# Patient Record
Sex: FEMALE | Race: BLACK OR AFRICAN AMERICAN | NOT HISPANIC OR LATINO | Employment: UNEMPLOYED | URBAN - METROPOLITAN AREA
[De-identification: names, ages, dates, MRNs, and addresses within clinical notes are randomized per-mention and may not be internally consistent; named-entity substitution may affect disease eponyms.]

---

## 2023-10-20 ENCOUNTER — HOSPITAL ENCOUNTER (OUTPATIENT)
Dept: CARDIOLOGY | Facility: HOSPITAL | Age: 88
Discharge: HOME | End: 2023-10-20
Payer: MEDICARE

## 2023-10-20 DIAGNOSIS — I48.21 PERMANENT ATRIAL FIBRILLATION (MULTI): ICD-10-CM

## 2023-10-20 PROCEDURE — 93248 EXT ECG>7D<15D REV&INTERPJ: CPT | Performed by: INTERNAL MEDICINE

## 2023-11-15 ENCOUNTER — OFFICE VISIT (OUTPATIENT)
Dept: CARDIOLOGY | Facility: CLINIC | Age: 88
End: 2023-11-15
Payer: MEDICARE

## 2023-11-15 VITALS
HEIGHT: 63 IN | DIASTOLIC BLOOD PRESSURE: 87 MMHG | OXYGEN SATURATION: 97 % | HEART RATE: 93 BPM | WEIGHT: 137 LBS | SYSTOLIC BLOOD PRESSURE: 159 MMHG | BODY MASS INDEX: 24.27 KG/M2

## 2023-11-15 DIAGNOSIS — I48.21 PERMANENT ATRIAL FIBRILLATION (MULTI): ICD-10-CM

## 2023-11-15 PROCEDURE — 1036F TOBACCO NON-USER: CPT | Performed by: INTERNAL MEDICINE

## 2023-11-15 PROCEDURE — 93010 ELECTROCARDIOGRAM REPORT: CPT | Performed by: INTERNAL MEDICINE

## 2023-11-15 PROCEDURE — 1159F MED LIST DOCD IN RCRD: CPT | Performed by: INTERNAL MEDICINE

## 2023-11-15 PROCEDURE — 99213 OFFICE O/P EST LOW 20 MIN: CPT | Mod: PO,25 | Performed by: INTERNAL MEDICINE

## 2023-11-15 PROCEDURE — 93005 ELECTROCARDIOGRAM TRACING: CPT | Mod: PO | Performed by: INTERNAL MEDICINE

## 2023-11-15 PROCEDURE — 99213 OFFICE O/P EST LOW 20 MIN: CPT | Performed by: INTERNAL MEDICINE

## 2023-11-15 PROCEDURE — 1126F AMNT PAIN NOTED NONE PRSNT: CPT | Performed by: INTERNAL MEDICINE

## 2023-11-15 RX ORDER — METOPROLOL SUCCINATE 25 MG/1
50 TABLET, EXTENDED RELEASE ORAL EVERY MORNING
COMMUNITY

## 2023-11-15 RX ORDER — DILTIAZEM HYDROCHLORIDE 240 MG/1
240 CAPSULE, COATED, EXTENDED RELEASE ORAL DAILY
COMMUNITY

## 2023-11-15 RX ORDER — CHOLECALCIFEROL (VITAMIN D3) 50 MCG
50 TABLET ORAL DAILY
COMMUNITY

## 2023-11-15 RX ORDER — ENALAPRIL MALEATE 10 MG/1
10 TABLET ORAL DAILY
COMMUNITY

## 2023-11-15 RX ORDER — IPRATROPIUM BROMIDE 21 UG/1
2 SPRAY, METERED NASAL EVERY 12 HOURS
COMMUNITY

## 2023-11-15 RX ORDER — LEVOTHYROXINE SODIUM 112 UG/1
112 CAPSULE ORAL
COMMUNITY

## 2023-11-15 RX ORDER — METFORMIN HYDROCHLORIDE EXTENDED-RELEASE TABLETS 1000 MG/1
1000 TABLET, FILM COATED, EXTENDED RELEASE ORAL 2 TIMES DAILY
COMMUNITY

## 2023-11-15 RX ORDER — CALCIUM CARBONATE 300MG(750)
400 TABLET,CHEWABLE ORAL DAILY
COMMUNITY

## 2023-11-15 RX ORDER — BLOOD SUGAR DIAGNOSTIC
STRIP MISCELLANEOUS
COMMUNITY
Start: 2015-05-27

## 2023-11-15 RX ORDER — GLIMEPIRIDE 4 MG/1
4 TABLET ORAL 2 TIMES DAILY
COMMUNITY

## 2023-11-15 RX ORDER — FERROUS SULFATE 325(65) MG
65 TABLET, DELAYED RELEASE (ENTERIC COATED) ORAL 2 TIMES DAILY
COMMUNITY

## 2023-11-15 RX ORDER — SIMVASTATIN 10 MG/1
10 TABLET, FILM COATED ORAL NIGHTLY
COMMUNITY

## 2023-11-15 RX ORDER — HYDROCHLOROTHIAZIDE 25 MG/1
25 TABLET ORAL DAILY
COMMUNITY

## 2023-11-15 RX ORDER — OMEPRAZOLE 40 MG/1
40 CAPSULE, DELAYED RELEASE ORAL 2 TIMES DAILY
COMMUNITY

## 2023-11-15 RX ORDER — KETOCONAZOLE 20 MG/G
CREAM TOPICAL DAILY
COMMUNITY

## 2023-11-15 ASSESSMENT — ENCOUNTER SYMPTOMS
LOSS OF SENSATION IN FEET: 0
DEPRESSION: 0
OCCASIONAL FEELINGS OF UNSTEADINESS: 0

## 2023-11-15 ASSESSMENT — PATIENT HEALTH QUESTIONNAIRE - PHQ9
SUM OF ALL RESPONSES TO PHQ9 QUESTIONS 1 AND 2: 0
1. LITTLE INTEREST OR PLEASURE IN DOING THINGS: NOT AT ALL
2. FEELING DOWN, DEPRESSED OR HOPELESS: NOT AT ALL

## 2023-11-15 ASSESSMENT — PAIN SCALES - GENERAL: PAINLEVEL: 0-NO PAIN

## 2023-11-15 NOTE — PATIENT INSTRUCTIONS
We discussed that the heart monitor showed that during the day the heart rates can get fast - up to 140 bpm. This is why you are likely getting short of breath with activity.    At night your heart rates dip quite low in the 30's and the average heart rate is 76 bpm.  This means that we cannot add in any more medication to slow the heart rates because then you would go too slow.  A pacemaker would help support the heart rate and permit us to uptitrate the medication to bring the rates down.    Please refer to  your pacemaker booklet for additional information.  [facility] will follow up with you in the coming weeks to see if you have any questions or if you would like to proceed.   You can call us at [phone] at any time.     Schedule a follow up in 6 months.

## 2023-11-15 NOTE — PROGRESS NOTES
Returns for follow up visit for  to discuss results of cardiac monitor.          History Of Present Illness:    Mary Andersen is a 89 y.o. year old female patient     89 yo seen for persistent atrial fibrillation. She is on a rate control strategy.     She was seen in September 2023 for routine follow up.  She is living with her daughter. The daughter notes that she doesn't do much walking becauese she does become short of breath after short distances. With a grocery cart she is better. She is cooking her own meals every day.     She denies any shortness of breath, no lower extremity edema. She also denies orthopnea or PND> No syncope and or falls.  We had decided to place a heart monitor to assess her heart rates with activity.    She denied orthopnea, or lower extremity edema.    Past Medical History:  Past Medical History:   Diagnosis Date    Other conditions influencing health status     Arthritis    Personal history of diseases of the blood and blood-forming organs and certain disorders involving the immune mechanism     History of anemia    Personal history of other diseases of the circulatory system     History of hypertension    Personal history of other diseases of the digestive system     History of esophageal reflux    Personal history of other diseases of the nervous system and sense organs     History of cataract    Personal history of other endocrine, nutritional and metabolic disease     History of diabetes mellitus    Personal history of other endocrine, nutritional and metabolic disease     History of hypothyroidism    Personal history of other medical treatment 02/10/2016    History of screening mammography       Past Surgical History:  Past Surgical History:   Procedure Laterality Date    BREAST LUMPECTOMY  11/21/2013    Breast Surgery Lumpectomy    CATARACT EXTRACTION  04/13/2018    Cataract Surgery    COLONOSCOPY  04/13/2018    Colonoscopy    HYSTERECTOMY  11/21/2013    Hysterectomy     "SHOULDER SURGERY  04/13/2018    Shoulder Surgery    TONSILLECTOMY  11/21/2013    Tonsillectomy      Social History:  Caffeine: 1 cup  day  Cigarettes: none  ETOH: none  Drugs: none  Exercise: not much  Lives with her daughter     Allergies:  No Known Allergies     Outpatient Medications:  Current Outpatient Medications   Medication Instructions    apixaban (ELIQUIS) 5 mg, oral, 2 times daily    blood sugar diagnostic (Accu-Chek Karina Plus test strp) strip Daily RT    cholecalciferol (VITAMIN D-3) 50 mcg, oral, Daily    dilTIAZem CD (CARDIZEM CD) 240 mg, oral, Daily    enalapril (VASOTEC) 10 mg, oral, Daily    ferrous sulfate 65 mg, oral, 2 times daily    glimepiride (AMARYL) 4 mg, oral, 2 times daily    hydroCHLOROthiazide (HYDRODIURIL) 25 mg, oral, Daily    ipratropium (Atrovent) 21 mcg (0.03 %) nasal spray 2 sprays, Each Nostril, Every 12 hours    ketoconazole (NIZOral) 2 % cream Topical, Daily    levothyroxine (TIROSINT) 112 mcg, oral, Daily before breakfast    magnesium oxide (MAG-OX) 400 mg, oral, Daily    metFORMIN (OSM) (FORTAMET) 1,000 mg, oral, 2 times daily, Do not crush, chew, or split.    metoprolol succinate XL (TOPROL-XL) 50 mg, oral, Every morning, And 25 mg every evening    omeprazole (PRILOSEC) 40 mg, oral, 2 times daily, Do not crush or chew.    simvastatin (ZOCOR) 10 mg, oral, Nightly    sitaGLIPtin phosphate (JANUVIA) 100 mg, oral, Daily         Last Recorded Vitals:      12/9/2020     8:23 AM 12/9/2020     8:26 AM 1/28/2021     9:18 AM 9/8/2021     2:32 PM 9/14/2022     1:29 PM 11/15/2023     3:30 PM   Vitals   Systolic  158 164 126 128 159   Diastolic  93 74 70 66 87   Heart Rate  91 78 72 77 93   Height (in) 1.6 m (5' 3\")  1.6 m (5' 3\")  1.6 m (5' 3\") 1.6 m (5' 3\")   Weight (lb) 167.25  162.44 147 150.13 137   BMI 29.63 kg/m2  28.77 kg/m2 26.04 kg/m2 26.59 kg/m2 24.27 kg/m2   BSA (m2) 1.84 m2  1.81 m2 1.72 m2 1.74 m2 1.66 m2   Visit Report      Report        Physical Exam:  deferred    Last " Cardiology Tests:  ECG:    Atrial fibrillation V Rate 98 bpm RBBB LAD      Cardiac Monitor:          Assessment/Plan   Problem List Items Addressed This Visit    None  Visit Diagnoses         Codes    Permanent atrial fibrillation (CMS/[facility])     I48.21    Relevant Medications    dilTIAZem CD (Cardizem CD) 240 mg 24 hr capsule    metoprolol succinate XL (Toprol-XL) 25 mg 24 hr tablet    Other Relevant Orders    ECG 12 lead (Clinic Performed)        WE spent the visit discussing the results of the cardiac monitor which show that during the day her heart rates are elevated and at times up to 140 bpm.  At night when she sleeps the heart rates dip into the 20's but average hr is 76 bpm.   I am reluctant to increase her AVN blocker agents given the slow heart rates and instead suggested that a pacemaker would permit us to support the heart rate and increase her AVN blocking agents to normalize the heart rates with activity.  Her daughter was present for the discussion.  The patient  was given a booklet about pacemakers and will consider if this is something she would proceed with.      Linda Mcgrath MD

## 2023-11-27 LAB
ATRIAL RATE: 94 BPM
Q ONSET: 229 MS
QRS COUNT: 16 BEATS
QRS DURATION: 148 MS
QT INTERVAL: 386 MS
QTC CALCULATION(BAZETT): 492 MS
QTC FREDERICIA: 454 MS
R AXIS: -73 DEGREES
T AXIS: -21 DEGREES
T OFFSET: 422 MS
VENTRICULAR RATE: 98 BPM

## 2024-05-20 PROBLEM — E03.9 HYPOTHYROIDISM: Status: ACTIVE | Noted: 2018-03-04

## 2024-05-20 PROBLEM — D64.9 ESSENTIAL ANEMIA: Status: ACTIVE | Noted: 2024-05-20

## 2024-05-20 PROBLEM — I10 BENIGN ESSENTIAL HYPERTENSION: Status: ACTIVE | Noted: 2018-03-04

## 2024-05-20 PROBLEM — E11.9 TYPE 2 DIABETES MELLITUS WITHOUT COMPLICATIONS (MULTI): Chronic | Status: ACTIVE | Noted: 2018-03-04

## 2024-05-20 PROBLEM — K21.9 GASTRO-ESOPHAGEAL REFLUX DISEASE WITHOUT ESOPHAGITIS: Status: ACTIVE | Noted: 2018-03-04

## 2024-05-20 PROBLEM — I48.21 ATRIAL FIBRILLATION, PERMANENT (MULTI): Chronic | Status: ACTIVE | Noted: 2018-09-28

## 2024-05-23 ENCOUNTER — OFFICE VISIT (OUTPATIENT)
Dept: CARDIOLOGY | Facility: CLINIC | Age: 89
End: 2024-05-23
Payer: MEDICARE

## 2024-05-23 VITALS
HEART RATE: 64 BPM | BODY MASS INDEX: 25.16 KG/M2 | DIASTOLIC BLOOD PRESSURE: 73 MMHG | OXYGEN SATURATION: 96 % | HEIGHT: 63 IN | WEIGHT: 142 LBS | SYSTOLIC BLOOD PRESSURE: 120 MMHG

## 2024-05-23 DIAGNOSIS — I48.21 PERMANENT ATRIAL FIBRILLATION (MULTI): ICD-10-CM

## 2024-05-23 PROBLEM — M16.0 PRIMARY OSTEOARTHRITIS OF BOTH HIPS: Status: ACTIVE | Noted: 2024-05-23

## 2024-05-23 PROBLEM — J31.0 CHRONIC RHINITIS: Status: ACTIVE | Noted: 2024-05-23

## 2024-05-23 PROBLEM — M16.10 ARTHRITIS PAIN, HIP: Status: ACTIVE | Noted: 2024-05-23

## 2024-05-23 PROBLEM — E78.5 HYPERLIPIDEMIA: Status: ACTIVE | Noted: 2024-05-23

## 2024-05-23 PROBLEM — E04.1 RIGHT THYROID NODULE: Status: ACTIVE | Noted: 2024-05-23

## 2024-05-23 PROBLEM — M85.80 OSTEOPENIA: Status: ACTIVE | Noted: 2024-05-23

## 2024-05-23 PROBLEM — E55.9 VITAMIN D DEFICIENCY: Status: ACTIVE | Noted: 2024-05-23

## 2024-05-23 PROCEDURE — 1159F MED LIST DOCD IN RCRD: CPT | Performed by: INTERNAL MEDICINE

## 2024-05-23 PROCEDURE — 99214 OFFICE O/P EST MOD 30 MIN: CPT | Performed by: INTERNAL MEDICINE

## 2024-05-23 PROCEDURE — 1126F AMNT PAIN NOTED NONE PRSNT: CPT | Performed by: INTERNAL MEDICINE

## 2024-05-23 PROCEDURE — 3074F SYST BP LT 130 MM HG: CPT | Performed by: INTERNAL MEDICINE

## 2024-05-23 PROCEDURE — 93005 ELECTROCARDIOGRAM TRACING: CPT | Performed by: INTERNAL MEDICINE

## 2024-05-23 PROCEDURE — 93010 ELECTROCARDIOGRAM REPORT: CPT | Performed by: INTERNAL MEDICINE

## 2024-05-23 PROCEDURE — 3078F DIAST BP <80 MM HG: CPT | Performed by: INTERNAL MEDICINE

## 2024-05-23 PROCEDURE — 1036F TOBACCO NON-USER: CPT | Performed by: INTERNAL MEDICINE

## 2024-05-23 ASSESSMENT — CHA2DS2 SCORE
VASCULAR DISEASE: NO
HYPERTENSION: YES
AGE IN YEARS: 75+
SEX: FEMALE
CHF OR LEFT VENTRICULAR DYSFUNCTION: NO
DIABETES: YES
CHA2D2S VASC SCORE: 5
PRIOR STROKE OR TIA OR THROMBOEMBOLISM: NO

## 2024-05-23 ASSESSMENT — PATIENT HEALTH QUESTIONNAIRE - PHQ9
2. FEELING DOWN, DEPRESSED OR HOPELESS: NOT AT ALL
1. LITTLE INTEREST OR PLEASURE IN DOING THINGS: NOT AT ALL
SUM OF ALL RESPONSES TO PHQ9 QUESTIONS 1 & 2: 0

## 2024-05-23 ASSESSMENT — COLUMBIA-SUICIDE SEVERITY RATING SCALE - C-SSRS
1. IN THE PAST MONTH, HAVE YOU WISHED YOU WERE DEAD OR WISHED YOU COULD GO TO SLEEP AND NOT WAKE UP?: NO
2. HAVE YOU ACTUALLY HAD ANY THOUGHTS OF KILLING YOURSELF?: NO
6. HAVE YOU EVER DONE ANYTHING, STARTED TO DO ANYTHING, OR PREPARED TO DO ANYTHING TO END YOUR LIFE?: NO

## 2024-05-23 ASSESSMENT — ENCOUNTER SYMPTOMS
OCCASIONAL FEELINGS OF UNSTEADINESS: 0
DEPRESSION: 0

## 2024-05-23 ASSESSMENT — PAIN SCALES - GENERAL: PAINLEVEL: 0-NO PAIN

## 2024-05-23 NOTE — PATIENT INSTRUCTIONS
It was nice to see you !  The ECG shows that your heart rates are good today and the bp is also better.  (120/73)   Keep up your diet changes and consider adding in more exercise.    We discussed that if you are more short of breath and we want to increase the heart rate medications we would consider a pacemaker.  Please schedule a return visit in 6 months.  Call if any issues prior to then [phone].

## 2024-05-23 NOTE — PROGRESS NOTES
"Returns for follow up visit for  persistent AF  Chief Complaint   Patient presents with    Hypertension    Atrial Fibrillation     She presents for a routine 6 month follow-up visit.       PMH includes HTN, DM T2, HL, Hypothyroidism, and GERD.      Today she denies any cardiac symptoms and states \"she feels good\".  Her only complaint is that she can't walk long distances.    MILDRED Solares       [id number] Score: 5   (Age, sex, htn, and dm)    History Of Present Illness:    Mary Andersen is a 89 y.o. year old female patient   AT the 11/23 visit we spent the visit discussing the results of the cardiac monitor which show that during the day her heart rates are elevated and at times up to 140 bpm.  At night when she sleeps the heart rates dip into the 20's but average hr is 76 bpm.   I am reluctant to increase her AVN blocker agents given the slow heart rates and instead suggested that a pacemaker would permit us to support the heart rate and increase her AVN blocking agents to normalize the heart rates with activity.  Her daughter was present for the discussion.  The patient  was given a booklet about pacemakers and will consider if this is something she would proceed with.      Short of breath with activity. No lightheadedness or syncope. No falls.  No orthopnea,No PND.       Past Medical History:  Past Medical History:   Diagnosis Date    Other conditions influencing health status     Arthritis    Personal history of diseases of the blood and blood-forming organs and certain disorders involving the immune mechanism     History of anemia    Personal history of other diseases of the circulatory system     History of hypertension    Personal history of other diseases of the digestive system     History of esophageal reflux    Personal history of other diseases of the nervous system and sense organs     History of cataract    Personal history of other endocrine, nutritional and metabolic disease     History of " diabetes mellitus    Personal history of other endocrine, nutritional and metabolic disease     History of hypothyroidism    Personal history of other medical treatment 02/10/2016    History of screening mammography       Past Surgical History:  Past Surgical History:   Procedure Laterality Date    BREAST LUMPECTOMY  11/21/2013    Breast Surgery Lumpectomy    CATARACT EXTRACTION  04/13/2018    Cataract Surgery    COLONOSCOPY  04/13/2018    Colonoscopy    HYSTERECTOMY  11/21/2013    Hysterectomy    SHOULDER SURGERY  04/13/2018    Shoulder Surgery    TONSILLECTOMY  11/21/2013    Tonsillectomy        Social History:  Caffeine: decaf coffee   Cigarettes: none  ETOH: none  Drugs: none  Exercise: once a week the stationary cycle   Occ: retired  Marital status:  lives with daughter    Family History:  No family history on file.      Allergies:  No Known Allergies     Outpatient Medications:  Current Outpatient Medications   Medication Instructions    apixaban (ELIQUIS) 5 mg, oral, 2 times daily    blood sugar diagnostic (Accu-Chek Karina Plus test strp) strip Daily RT    cholecalciferol (VITAMIN D-3) 50 mcg, oral, Daily    dilTIAZem CD (CARDIZEM CD) 240 mg, oral, Daily    enalapril (VASOTEC) 10 mg, oral, Daily    ferrous sulfate 65 mg, oral, 2 times daily    glimepiride (AMARYL) 4 mg, oral, 2 times daily    hydroCHLOROthiazide (HYDRODIURIL) 25 mg, oral, Daily    ipratropium (Atrovent) 21 mcg (0.03 %) nasal spray 2 sprays, Each Nostril, Every 12 hours    ketoconazole (NIZOral) 2 % cream Topical, Daily    levothyroxine (TIROSINT) 112 mcg, oral, Daily before breakfast    magnesium oxide (MAG-OX) 400 mg, oral, Daily    metFORMIN (OSM) (FORTAMET) 1,000 mg, oral, 2 times daily, Do not crush, chew, or split.    metoprolol succinate XL (TOPROL-XL) 50 mg, oral, Every morning, And 25 mg every evening    omeprazole (PRILOSEC) 40 mg, oral, 2 times daily, Do not crush or chew.    simvastatin (ZOCOR) 10 mg, oral, Nightly    SITagliptin  "phosphate (JANUVIA) 100 mg, oral, Daily         Last Recorded Vitals:      12/9/2020     8:23 AM 12/9/2020     8:26 AM 1/28/2021     9:18 AM 9/8/2021     2:32 PM 9/14/2022     1:29 PM 11/15/2023     3:30 PM 5/23/2024     2:00 PM   Vitals   Systolic  158 164 126 128 159 120   Diastolic  93 74 70 66 87 73   Heart Rate  91 78 72 77 93 64   Height (in) 1.6 m (5' 3\")  1.6 m (5' 3\")  1.6 m (5' 3\") 1.6 m (5' 3\") 1.6 m (5' 3\")   Weight (lb) 167.25  162.44 147 150.13 137 142   BMI 29.63 kg/m2  28.77 kg/m2 26.04 kg/m2 26.59 kg/m2 24.27 kg/m2 25.15 kg/m2   BSA (m2) 1.84 m2  1.81 m2 1.72 m2 1.74 m2 1.66 m2 1.69 m2   Visit Report      Report Report        Physical Exam:  Physical Exam  Constitutional:       Appearance: Normal appearance.   HENT:      Head: Normocephalic.      Nose: Nose normal.   Neck:      Vascular: No carotid bruit.   Cardiovascular:      Rate and Rhythm: Normal rate. Rhythm irregular.      Heart sounds: Normal heart sounds. No murmur heard.     No gallop.   Pulmonary:      Effort: Pulmonary effort is normal.      Breath sounds: Normal breath sounds.   Musculoskeletal:         General: Normal range of motion.      Right lower leg: No edema.      Left lower leg: No edema.   Skin:     General: Skin is warm and dry.   Neurological:      General: No focal deficit present.      Mental Status: She is alert and oriented to person, place, and time.   Psychiatric:         Mood and Affect: Mood normal.         Behavior: Behavior normal.          Last Cardiology Tests:  ECG:  AF VR 66 bpm  RBBB  msec    Echo:    TRANSTHORACIC ECHOCARDIOGRAM REPORT        Patient Name:     SALVADOR Hazel Physician:   [id number] Terrence Cruz MD  Study Date:       10/13/2022      Referring Physician: RONDA COHN  MRN/PID:          [id number]        PCP:  Accession/Order#: [id number]    Department Location: [facility]                                      "                    Invasive  YOB: 1932        Fellow:  Gender:           F               Nurse:  Admit Date:                       Sonographer:         Eva RUSHING  Admission Status: Outpatient      Additional Staff:  Height:           160.02 cm       CC Report to:  Weight:           68.04 kg        Study Type:          Echocardiogram  BSA:              1.71 m2  Blood Pressure: 128 /66 mmHg     Diagnosis/ICD: I48.21-Permanent AFib; R55-Syncope  Indication:    Afib, Syncope  Procedure/CPT: Echo Complete w Full [id number]     Patient History:  Pertinent History: Afib, HTN, Anemia, DM2, Syncope.     Study Detail: The following Echo studies were performed: 2D, M-Mode, Doppler and                color flow.        PHYSICIAN INTERPRETATION:  Left Ventricle: The left ventricular systolic function is normal, with an estimated ejection fraction of 60-65%. There are no regional wall motion abnormalities. The left ventricular cavity size is normal. There is mild concentric left ventricular hypertrophy. Spectral Doppler shows a restrictive pattern of left ventricular diastolic filling. There is an elevated mean left atrial pressure.  Left Atrium: The left atrium is severely dilated.  Right Ventricle: The right ventricle is upper limits of normal in size. There is normal right ventricular global systolic function.  Right Atrium: The right atrium is moderately dilated.  Aortic Valve: The aortic valve is trileaflet. There is mild aortic valve thickening. There is mild aortic valve regurgitation. The peak instantaneous gradient of the aortic valve is 8.3 mmHg.  Mitral Valve: The mitral valve is mildly thickened. There is mild mitral annular calcification. There is mild mitral valve regurgitation.  Tricuspid Valve: The tricuspid valve is structurally normal. There is mild to moderate tricuspid regurgitation.  Pulmonic Valve: The pulmonic valve is structurally normal. There is physiologic pulmonic valve  regurgitation.  Pericardium: There is a trivial pericardial effusion.  Aorta: The aortic root is normal.  Pulmonary Artery: The tricuspid regurgitant velocity is 3.19 m/s, and with an estimated right atrial pressure of 15 mmHg, the estimated pulmonary artery pressure is moderately elevated with the RVSP at 55.7 mmHg.  Systemic Veins: The inferior vena cava appears mildly dilated. There is poor inspiratory collapse of the IVC (less than 50%), consistent with elevated right atrial pressure.  In comparison to the previous echocardiogram(s): Compared with study from 4/2/2019, no significant change.        CONCLUSIONS:   1. Left ventricular systolic function is normal with a 60-65% estimated ejection fraction.   2. Spectral Doppler shows a restrictive pattern of left ventricular diastolic filling.   3. There is an elevated mean left atrial pressure.   4. The left atrium is severely dilated.   5. The right atrium is moderately dilated.   6. Mild to moderate tricuspid regurgitation visualized.   7. Mild aortic valve regurgitation.   8. Moderately elevated pulmonary artery pressure.       Lab review: I have Chemistry CMP:   Lab Results   Component Value Date    ALBUMIN 3.9 03/04/2018    CALCIUM 9.5 03/04/2018    CO2 27 03/04/2018    CREATININE 0.89 03/04/2018    GLUCOSE 213 (H) 03/04/2018    ALT 16 05/23/2018   , Chemistry BMP   Lab Results   Component Value Date    GLUCOSE 213 (H) 03/04/2018    CALCIUM 9.5 03/04/2018    CO2 27 03/04/2018    CREATININE 0.89 03/04/2018   , and CBC:  Lab Results   Component Value Date    WBC 8.3 03/04/2018    RBC 4.21 03/04/2018    HGB 12.2 03/04/2018    HCT 35.7 (L) 03/04/2018    MCV 85 03/04/2018    MCHC 34.2 03/04/2018    RDW 12.3 03/04/2018    NRBC 0.0 03/04/2018       Assessment/Plan   Problem List Items Addressed This Visit    None  Visit Diagnoses         Codes    Permanent atrial fibrillation (Multi)     I48.21        RTC 6 months      Linda Mcgrath MD

## 2024-06-07 LAB
ATRIAL RATE: 63 BPM
Q ONSET: 228 MS
QRS COUNT: 11 BEATS
QRS DURATION: 154 MS
QT INTERVAL: 452 MS
QTC CALCULATION(BAZETT): 473 MS
QTC FREDERICIA: 467 MS
R AXIS: -29 DEGREES
T AXIS: -44 DEGREES
T OFFSET: 454 MS
VENTRICULAR RATE: 66 BPM

## 2024-11-20 ENCOUNTER — APPOINTMENT (OUTPATIENT)
Dept: CARDIOLOGY | Facility: CLINIC | Age: 89
End: 2024-11-20
Payer: MEDICARE

## 2025-01-23 ENCOUNTER — OFFICE VISIT (OUTPATIENT)
Dept: CARDIOLOGY | Facility: CLINIC | Age: OVER 89
End: 2025-01-23
Payer: MEDICARE

## 2025-01-23 VITALS
HEIGHT: 63 IN | WEIGHT: 143.6 LBS | HEART RATE: 79 BPM | OXYGEN SATURATION: 98 % | DIASTOLIC BLOOD PRESSURE: 71 MMHG | BODY MASS INDEX: 25.45 KG/M2 | SYSTOLIC BLOOD PRESSURE: 150 MMHG

## 2025-01-23 DIAGNOSIS — I48.21 ATRIAL FIBRILLATION, PERMANENT (MULTI): Chronic | ICD-10-CM

## 2025-01-23 PROCEDURE — 99214 OFFICE O/P EST MOD 30 MIN: CPT | Performed by: INTERNAL MEDICINE

## 2025-01-23 PROCEDURE — 93005 ELECTROCARDIOGRAM TRACING: CPT | Performed by: INTERNAL MEDICINE

## 2025-01-23 PROCEDURE — 1036F TOBACCO NON-USER: CPT | Performed by: INTERNAL MEDICINE

## 2025-01-23 PROCEDURE — 3078F DIAST BP <80 MM HG: CPT | Performed by: INTERNAL MEDICINE

## 2025-01-23 PROCEDURE — 1126F AMNT PAIN NOTED NONE PRSNT: CPT | Performed by: INTERNAL MEDICINE

## 2025-01-23 PROCEDURE — 1159F MED LIST DOCD IN RCRD: CPT | Performed by: INTERNAL MEDICINE

## 2025-01-23 PROCEDURE — 3077F SYST BP >= 140 MM HG: CPT | Performed by: INTERNAL MEDICINE

## 2025-01-23 RX ORDER — AMOXICILLIN 500 MG/1
1 CAPSULE ORAL EVERY 8 HOURS SCHEDULED
COMMUNITY
Start: 2025-01-22

## 2025-01-23 ASSESSMENT — CHA2DS2 SCORE
PRIOR STROKE OR TIA OR THROMBOEMBOLISM: NO
SEX: FEMALE
DIABETES: YES
CHA2D2S VASC SCORE: 5
VASCULAR DISEASE: NO
HYPERTENSION: YES
CHF OR LEFT VENTRICULAR DYSFUNCTION: NO
AGE IN YEARS: 75+

## 2025-01-23 ASSESSMENT — ENCOUNTER SYMPTOMS
DEPRESSION: 0
OCCASIONAL FEELINGS OF UNSTEADINESS: 0
LOSS OF SENSATION IN FEET: 0

## 2025-01-23 ASSESSMENT — PATIENT HEALTH QUESTIONNAIRE - PHQ9
1. LITTLE INTEREST OR PLEASURE IN DOING THINGS: NOT AT ALL
SUM OF ALL RESPONSES TO PHQ9 QUESTIONS 1 AND 2: 0
2. FEELING DOWN, DEPRESSED OR HOPELESS: NOT AT ALL

## 2025-01-23 ASSESSMENT — PAIN SCALES - GENERAL: PAINLEVEL_OUTOF10: 0-NO PAIN

## 2025-01-23 NOTE — PROGRESS NOTES
Returns for follow up visit for    Chief Complaint   Patient presents with    Atrial Fibrillation     Patient returns to clinic accompanied by her daughter voicing no cardiac complaints. Patient denies palpitations, lightheadedness, syncope, dyspnea, orthopnea and chest pain/discomfort.  PARVIN Thomas RN         History Of Present Illness:    Mary Andersen is a 89 y.o. year old female patient persistent atrial fibrillation.    She reports  no breathing issues.    No orthopnea or PND.    No falls.  She remembers her medications.     [id number] Stroke Risk Points: 5   Values used to calculate this score:    Points  Metrics       0        Has Congestive Heart Failure: No       1        Has Hypertension: Yes       2        Age: 89       1        Has Diabetes: Yes       0        Had Stroke: No                 Had TIA: No                 Had Thromboembolism: No       0        Has Vascular Disease: No       1        Clinically Relevant Sex: Female     Past Medical History:  Past Medical History:   Diagnosis Date    Other conditions influencing health status     Arthritis    Personal history of diseases of the blood and blood-forming organs and certain disorders involving the immune mechanism     History of anemia    Personal history of other diseases of the circulatory system     History of hypertension    Personal history of other diseases of the digestive system     History of esophageal reflux    Personal history of other diseases of the nervous system and sense organs     History of cataract    Personal history of other endocrine, nutritional and metabolic disease     History of diabetes mellitus    Personal history of other endocrine, nutritional and metabolic disease     History of hypothyroidism    Personal history of other medical treatment 02/10/2016    History of screening mammography       Past Surgical History:  Past Surgical History:   Procedure Laterality Date    BREAST LUMPECTOMY  11/21/2013    Breast  "Surgery Lumpectomy    CATARACT EXTRACTION  04/13/2018    Cataract Surgery    COLONOSCOPY  04/13/2018    Colonoscopy    HYSTERECTOMY  11/21/2013    Hysterectomy    SHOULDER SURGERY  04/13/2018    Shoulder Surgery    TONSILLECTOMY  11/21/2013    Tonsillectomy          Social History:  Caffeine:  decaf   Cigarettes:  none  ETOH: none  Drugs: none  Exercise: She has a stationary bike which she use twice a week.  20min at a time.  Occ:retired  Marital status:  lives with daughter    Family History:  No family history on file.      Allergies:  No Known Allergies     Outpatient Medications:  Current Outpatient Medications   Medication Instructions    amoxicillin (Amoxil) 500 mg capsule 1 capsule, Every 8 hours scheduled    apixaban (ELIQUIS) 5 mg, 2 times daily    blood sugar diagnostic (Accu-Chek Karina Plus test strp) strip Daily RT    cholecalciferol (VITAMIN D-3) 50 mcg, Daily    dilTIAZem CD (CARDIZEM CD) 240 mg, Daily    enalapril (VASOTEC) 10 mg, Daily    ferrous sulfate 65 mg, 2 times daily    glimepiride (AMARYL) 4 mg, 2 times daily    hydroCHLOROthiazide (HYDRODIURIL) 25 mg, Daily    ipratropium (Atrovent) 21 mcg (0.03 %) nasal spray 2 sprays, Every 12 hours    ketoconazole (NIZOral) 2 % cream Daily    levothyroxine (TIROSINT) 112 mcg, Daily before breakfast    magnesium oxide (MAG-OX) 400 mg, Daily    metFORMIN (OSM) (FORTAMET) 1,000 mg, 2 times daily    metoprolol succinate XL (TOPROL-XL) 50 mg, Every morning    omeprazole (PRILOSEC) 40 mg, 2 times daily    simvastatin (ZOCOR) 10 mg, Nightly    SITagliptin phosphate (JANUVIA) 100 mg, Daily         Last Recorded Vitals:      1/28/2021     9:18 AM 9/8/2021     2:32 PM 9/14/2022     1:29 PM 11/15/2023     3:30 PM 5/23/2024     2:00 PM 1/23/2025     2:14 PM 1/23/2025     2:20 PM   Vitals   Systolic 164 126 128 159 120 163 150   Diastolic 74 70 66 87 73 80 71   BP Location    Left arm Left arm Left arm Right arm   Heart Rate 78 72 77 93 64 79    Height 1.6 m (5' 3\")  " "1.6 m (5' 3\") 1.6 m (5' 3\") 1.6 m (5' 3\") 1.6 m (5' 3\")    Weight (lb) 162.44 147 150.13 137 142 143.6    BMI 28.77 kg/m2 26.04 kg/m2 26.59 kg/m2 24.27 kg/m2 25.15 kg/m2 25.44 kg/m2    BSA (m2) 1.81 m2 1.72 m2 1.74 m2 1.66 m2 1.69 m2 1.7 m2    Visit Report    Report Report Report Report        Physical Exam:  Physical Exam  Constitutional:       Appearance: Normal appearance. She is normal weight.   HENT:      Head: Normocephalic.      Nose: Nose normal.   Neck:      Vascular: No carotid bruit.   Cardiovascular:      Rate and Rhythm: Normal rate. Rhythm irregular.      Pulses: Normal pulses.      Heart sounds: Normal heart sounds.   Pulmonary:      Breath sounds: Normal breath sounds. No rales.   Musculoskeletal:         General: Normal range of motion.      Right lower leg: No edema.      Left lower leg: No edema.   Skin:     General: Skin is warm and dry.   Neurological:      General: No focal deficit present.      Mental Status: She is alert and oriented to person, place, and time.   Psychiatric:         Mood and Affect: Mood normal.         Behavior: Behavior normal.          Last Cardiology Tests:  ECG:    ECG: Atrial fibrilation RBBB LAFB VR 84 bpm otherwise unchanged from previous.       Lab review: I have Chemistry CMP:   Lab Results   Component Value Date    ALBUMIN 3.9 03/04/2018    CALCIUM 9.5 03/04/2018    CO2 27 03/04/2018    CREATININE 0.89 03/04/2018    GLUCOSE 213 (H) 03/04/2018    ALT 16 05/23/2018   , Chemistry BMP   Lab Results   Component Value Date    GLUCOSE 213 (H) 03/04/2018    CALCIUM 9.5 03/04/2018    CO2 27 03/04/2018    CREATININE 0.89 03/04/2018   , and CBC:  Lab Results   Component Value Date    WBC 8.3 03/04/2018    RBC 4.21 03/04/2018    HGB 12.2 03/04/2018    HCT 35.7 (L) 03/04/2018    MCV 85 03/04/2018    MCHC 34.2 03/04/2018    RDW 12.3 03/04/2018    NRBC 0.0 03/04/2018       Assessment/Plan   Problem List Items Addressed This Visit             ICD-10-CM    Atrial fibrillation, " permanent (Multi) (Chronic) I48.21   Rates controlled.  She is due for lab work but will see her PCP this week.  We discussed that she will increase her activity level.  Continue eliquis for stroke risk reduction.  RTC Sept-Oct 2025.     Linda Mcgrath MD

## 2025-01-23 NOTE — PATIENT INSTRUCTIONS
It was nice to see you today!  The heart rates in atrial fibrillation are very good.   Continue your active and healthy lifestyle - try to add in another day of the stationary bicycle. Three times a week would be great.   Plan to have your blood work done when you see Dr Odom.    In anticipation of your tooth extraction do not take the  eliquis Friday, Sat or Sunday or Monday morning. The dentist will tell ou when to resume the eliquis.  Schedule a return visit in Sept-Oct 2025.

## 2025-01-27 LAB
ATRIAL RATE: 91 BPM
Q ONSET: 228 MS
QRS COUNT: 13 BEATS
QRS DURATION: 152 MS
QT INTERVAL: 414 MS
QTC CALCULATION(BAZETT): 489 MS
QTC FREDERICIA: 462 MS
R AXIS: -81 DEGREES
T AXIS: 16 DEGREES
T OFFSET: 435 MS
VENTRICULAR RATE: 84 BPM

## 2025-02-17 ENCOUNTER — CLINICAL SUPPORT (OUTPATIENT)
Dept: EMERGENCY MEDICINE | Facility: HOSPITAL | Age: OVER 89
End: 2025-02-17
Payer: MEDICARE

## 2025-02-17 LAB — GLUCOSE BLD MANUAL STRIP-MCNC: 130 MG/DL (ref 74–99)

## 2025-02-17 PROCEDURE — 82947 ASSAY GLUCOSE BLOOD QUANT: CPT

## 2025-02-17 PROCEDURE — 93005 ELECTROCARDIOGRAM TRACING: CPT

## 2025-02-17 PROCEDURE — 99285 EMERGENCY DEPT VISIT HI MDM: CPT | Performed by: EMERGENCY MEDICINE

## 2025-02-17 ASSESSMENT — COLUMBIA-SUICIDE SEVERITY RATING SCALE - C-SSRS
6. HAVE YOU EVER DONE ANYTHING, STARTED TO DO ANYTHING, OR PREPARED TO DO ANYTHING TO END YOUR LIFE?: NO
1. IN THE PAST MONTH, HAVE YOU WISHED YOU WERE DEAD OR WISHED YOU COULD GO TO SLEEP AND NOT WAKE UP?: NO
2. HAVE YOU ACTUALLY HAD ANY THOUGHTS OF KILLING YOURSELF?: NO

## 2025-02-18 ENCOUNTER — APPOINTMENT (OUTPATIENT)
Dept: RADIOLOGY | Facility: HOSPITAL | Age: OVER 89
End: 2025-02-18
Payer: MEDICARE

## 2025-02-18 ENCOUNTER — HOSPITAL ENCOUNTER (EMERGENCY)
Facility: HOSPITAL | Age: OVER 89
Discharge: HOME | End: 2025-02-18
Attending: EMERGENCY MEDICINE
Payer: MEDICARE

## 2025-02-18 VITALS
RESPIRATION RATE: 18 BRPM | SYSTOLIC BLOOD PRESSURE: 154 MMHG | HEART RATE: 83 BPM | WEIGHT: 146 LBS | TEMPERATURE: 96.3 F | BODY MASS INDEX: 25.87 KG/M2 | DIASTOLIC BLOOD PRESSURE: 93 MMHG | OXYGEN SATURATION: 99 % | HEIGHT: 63 IN

## 2025-02-18 DIAGNOSIS — E83.42 HYPOMAGNESEMIA: ICD-10-CM

## 2025-02-18 DIAGNOSIS — E16.2 HYPOGLYCEMIA: Primary | ICD-10-CM

## 2025-02-18 DIAGNOSIS — T38.3X5A: ICD-10-CM

## 2025-02-18 LAB
ALBUMIN SERPL BCP-MCNC: 3.8 G/DL (ref 3.4–5)
ALP SERPL-CCNC: 146 U/L (ref 33–136)
ALT SERPL W P-5'-P-CCNC: 7 U/L (ref 7–45)
ANION GAP SERPL CALC-SCNC: 14 MMOL/L (ref 10–20)
APPEARANCE UR: CLEAR
AST SERPL W P-5'-P-CCNC: 15 U/L (ref 9–39)
BASOPHILS # BLD AUTO: 0.02 X10*3/UL (ref 0–0.1)
BASOPHILS NFR BLD AUTO: 0.3 %
BILIRUB SERPL-MCNC: 0.5 MG/DL (ref 0–1.2)
BILIRUB UR STRIP.AUTO-MCNC: NEGATIVE MG/DL
BUN SERPL-MCNC: 27 MG/DL (ref 6–23)
CALCIUM SERPL-MCNC: 9 MG/DL (ref 8.6–10.6)
CARDIAC TROPONIN I PNL SERPL HS: 7 NG/L (ref 0–34)
CHLORIDE SERPL-SCNC: 89 MMOL/L (ref 98–107)
CO2 SERPL-SCNC: 23 MMOL/L (ref 21–32)
COLOR UR: NORMAL
CREAT SERPL-MCNC: 1.47 MG/DL (ref 0.5–1.05)
EGFRCR SERPLBLD CKD-EPI 2021: 33 ML/MIN/1.73M*2
EOSINOPHIL # BLD AUTO: 0.06 X10*3/UL (ref 0–0.4)
EOSINOPHIL NFR BLD AUTO: 1 %
ERYTHROCYTE [DISTWIDTH] IN BLOOD BY AUTOMATED COUNT: 12.1 % (ref 11.5–14.5)
FLUAV RNA RESP QL NAA+PROBE: NOT DETECTED
FLUBV RNA RESP QL NAA+PROBE: NOT DETECTED
GLUCOSE SERPL-MCNC: 90 MG/DL (ref 74–99)
GLUCOSE UR STRIP.AUTO-MCNC: NORMAL MG/DL
HCT VFR BLD AUTO: 27.8 % (ref 36–46)
HGB BLD-MCNC: 10 G/DL (ref 12–16)
HOLD SPECIMEN: NORMAL
IMM GRANULOCYTES # BLD AUTO: 0.02 X10*3/UL (ref 0–0.5)
IMM GRANULOCYTES NFR BLD AUTO: 0.3 % (ref 0–0.9)
KETONES UR STRIP.AUTO-MCNC: NEGATIVE MG/DL
LEUKOCYTE ESTERASE UR QL STRIP.AUTO: NEGATIVE
LIPASE SERPL-CCNC: 95 U/L (ref 9–82)
LYMPHOCYTES # BLD AUTO: 1.63 X10*3/UL (ref 0.8–3)
LYMPHOCYTES NFR BLD AUTO: 26.6 %
MAGNESIUM SERPL-MCNC: 1.07 MG/DL (ref 1.6–2.4)
MCH RBC QN AUTO: 30 PG (ref 26–34)
MCHC RBC AUTO-ENTMCNC: 36 G/DL (ref 32–36)
MCV RBC AUTO: 84 FL (ref 80–100)
MONOCYTES # BLD AUTO: 0.56 X10*3/UL (ref 0.05–0.8)
MONOCYTES NFR BLD AUTO: 9.1 %
NEUTROPHILS # BLD AUTO: 3.84 X10*3/UL (ref 1.6–5.5)
NEUTROPHILS NFR BLD AUTO: 62.7 %
NITRITE UR QL STRIP.AUTO: NEGATIVE
NRBC BLD-RTO: 0 /100 WBCS (ref 0–0)
PH UR STRIP.AUTO: 5 [PH]
PLATELET # BLD AUTO: 195 X10*3/UL (ref 150–450)
POTASSIUM SERPL-SCNC: 4.7 MMOL/L (ref 3.5–5.3)
PROT SERPL-MCNC: 6.9 G/DL (ref 6.4–8.2)
PROT UR STRIP.AUTO-MCNC: NEGATIVE MG/DL
Q ONSET: 227 MS
QRS COUNT: 15 BEATS
QRS DURATION: 160 MS
QT INTERVAL: 400 MS
QTC CALCULATION(BAZETT): 500 MS
QTC FREDERICIA: 464 MS
R AXIS: 267 DEGREES
RBC # BLD AUTO: 3.33 X10*6/UL (ref 4–5.2)
RBC # UR STRIP.AUTO: NEGATIVE MG/DL
SARS-COV-2 RNA RESP QL NAA+PROBE: NOT DETECTED
SODIUM SERPL-SCNC: 121 MMOL/L (ref 136–145)
SP GR UR STRIP.AUTO: 1.01
T AXIS: 29 DEGREES
T OFFSET: 427 MS
UROBILINOGEN UR STRIP.AUTO-MCNC: NORMAL MG/DL
VENTRICULAR RATE: 94 BPM
WBC # BLD AUTO: 6.1 X10*3/UL (ref 4.4–11.3)

## 2025-02-18 PROCEDURE — 2500000004 HC RX 250 GENERAL PHARMACY W/ HCPCS (ALT 636 FOR OP/ED)

## 2025-02-18 PROCEDURE — 36415 COLL VENOUS BLD VENIPUNCTURE: CPT

## 2025-02-18 PROCEDURE — 83735 ASSAY OF MAGNESIUM: CPT

## 2025-02-18 PROCEDURE — 80053 COMPREHEN METABOLIC PANEL: CPT

## 2025-02-18 PROCEDURE — 96365 THER/PROPH/DIAG IV INF INIT: CPT

## 2025-02-18 PROCEDURE — 84484 ASSAY OF TROPONIN QUANT: CPT

## 2025-02-18 PROCEDURE — 71046 X-RAY EXAM CHEST 2 VIEWS: CPT | Performed by: RADIOLOGY

## 2025-02-18 PROCEDURE — 83690 ASSAY OF LIPASE: CPT

## 2025-02-18 PROCEDURE — 87636 SARSCOV2 & INF A&B AMP PRB: CPT

## 2025-02-18 PROCEDURE — 85025 COMPLETE CBC W/AUTO DIFF WBC: CPT

## 2025-02-18 PROCEDURE — 71046 X-RAY EXAM CHEST 2 VIEWS: CPT

## 2025-02-18 PROCEDURE — 81003 URINALYSIS AUTO W/O SCOPE: CPT

## 2025-02-18 RX ORDER — MAGNESIUM SULFATE 1 G/100ML
1 INJECTION INTRAVENOUS ONCE
Status: COMPLETED | OUTPATIENT
Start: 2025-02-18 | End: 2025-02-18

## 2025-02-18 RX ADMIN — SODIUM CHLORIDE, POTASSIUM CHLORIDE, SODIUM LACTATE AND CALCIUM CHLORIDE 1000 ML: 600; 310; 30; 20 INJECTION, SOLUTION INTRAVENOUS at 05:05

## 2025-02-18 RX ADMIN — MAGNESIUM SULFATE HEPTAHYDRATE 1 G: 10 INJECTION, SOLUTION INTRAVENOUS at 05:06

## 2025-02-18 ASSESSMENT — LIFESTYLE VARIABLES
EVER FELT BAD OR GUILTY ABOUT YOUR DRINKING: NO
HAVE YOU EVER FELT YOU SHOULD CUT DOWN ON YOUR DRINKING: NO
EVER HAD A DRINK FIRST THING IN THE MORNING TO STEADY YOUR NERVES TO GET RID OF A HANGOVER: NO
HAVE PEOPLE ANNOYED YOU BY CRITICIZING YOUR DRINKING: NO
TOTAL SCORE: 0

## 2025-02-18 ASSESSMENT — PAIN DESCRIPTION - PROGRESSION: CLINICAL_PROGRESSION: NOT CHANGED

## 2025-02-18 ASSESSMENT — PAIN - FUNCTIONAL ASSESSMENT: PAIN_FUNCTIONAL_ASSESSMENT: 0-10

## 2025-02-18 ASSESSMENT — PAIN SCALES - GENERAL: PAINLEVEL_OUTOF10: 0 - NO PAIN

## 2025-02-18 NOTE — DISCHARGE INSTRUCTIONS
Your magnesium was low today and we gave you IV fluids and IV electrolytes.  Consider purchasing magnesium supplements to ensure that your levels stay normal.  Please also talk to your doctor about glyburide as this medication might be contributing to your hypoglycemia.  Your glucose was normal today in the emergency department.  Please make sure to schedule a follow-up appointment with your PCP as soon as possible.

## 2025-02-18 NOTE — ED PROVIDER NOTES
Emergency Department Provider Note        History of Present Illness     History provided by: Patient  Limitations to History: None  External Records Reviewed with Brief Summary: I reviewed prior ED visits, Care Everywhere, discharge summaries and outpatient records as appropriate.       HPI:  Mary Andersen is a 89 y.o. female Past medical history significant for arthritis, atrial fibrillation on Eliquis, hypertension, diabetes and thyroid disease presenting to the emergency department with concern for hypoglycemia.  Patient states she has had episodes of nausea for the past several days and family has noted that her glucose has been dropping, they are concerned because she is eating less secondary to the nausea.  States she has had loss of appetite but reports that this is because of the nausea that she experiences and when she is nauseous its because her blood sugar is low.  Also has some concerns about bilateral lower extremity swelling.  Denies any fever, chills, cough congestion or rhinorrhea.  No chest pain, no shortness of breath.  Denies nausea vomiting or diarrhea.  States every once in a while she will feel slightly winded when ambulating.  Denies headache, no recent falls, no additional acute complaints.      Physical Exam   Triage vitals:  T 35.7 °C (96.3 °F)  HR 86  /69  RR 18  O2 98 % None (Room air)    General: Awake, alert, in no acute distress  Eyes: Gaze conjugate.  No scleral icterus or injection  HENT: Normo-cephalic, atraumatic. No stridor  CV: Regular rate, regular rhythm. Radial pulses 2+ bilaterally  Resp: Breathing non-labored, speaking in full sentences.  Clear to auscultation bilaterally  GI: Soft, non-distended, non-tender. No rebound or guarding.  MSK/Extremities: No gross bony deformities. Moving all extremities  Skin: Warm. Appropriate color  Neuro: Alert. Oriented. Face symmetric. Speech is fluent.  Gross strength and sensation intact in b/l UE and LEs  Psych: Appropriate  mood and affect    Medical Decision Making & ED Course   Medical Decision Makin y.o. female presenting to the emergency department with concern for hypoglycemia.  On physical exam she is hemodynamically stable in no acute distress, overall well-appearing.  Differential includes infectious versus cardiopulmonary versus endocrine versus metabolic related causes.  Also considered medication related causes of hypoglycemia as patient notably is on glyburide and with her increase in nausea and poor appetite this is likely a contributing factor to her hypoglycemia.    Workup completed and reviewed, patient is negative for influenza COVID, she has no UTI on her urinalysis.  Lipase is elevated at 95, she has a slight HARISH but patient is aware of a decrease in her GFR and was informed of this at her last primary care visit.  With labs to compare to or greater than 6 years old.  She is without leukocytosis, her troponin is in with normal limits and her glucose is 130.  Chest x-ray obtained and is negative for acute cardiopulmonary causes such as pneumonia, pneumothorax, fluid overload or an effusion.  Her EKG shows atrial fibrillation and is at baseline when compared to previous EKGs.  She notably is hypomagnesemic and this was replaced here in the emergency department and she was given a liter of IV fluids.    On reevaluation, patient is tolerating p.o., remains well-appearing and in no acute distress.  Updated patient with results and discussed plan with family.  Offered admission for further workup of hypoglycemia however patient felt reassured by her workup today in the emergency department and are requesting discharge electing that they would prefer to follow-up with primary care.  Do not feel that further workup indicated at this time. Discussed results, diagnosis/differential, and plan with patient. Patient advised to follow up with primary physician in 2-3 days. Discussed return precautions and encouraged patient  to return to the Emergency Department for any concerning symptoms or worsening condition. Patient expresses understanding and is in agreement. All questions answered. Patient discharged in stable condition.  ----  Social Determinants of Health which Significantly Impact Care: None identified     EKG Independent Interpretation: EKG interpreted by myself. Please see ED Course for full interpretation.    Independent Result Review and Interpretation: Relevant laboratory and radiographic results were reviewed and independently interpreted by myself.  As necessary, they are commented on in the ED Course.    Chronic conditions affecting the patient's care: As documented above in [facility]    The patient was discussed with the following consultants/services: None    Care Considerations: As documented above in [facility]    ED Course:  ED Course as of 02/18/25 0630   e Feb 18, 2025   0447 EKG performed at 2203, interpreted by me.  Atrial fibrillation with rate of 94, right bundle branch block.  No ST elevation, no T wave abnormalities.  When compared to EKG from January 2025 and May 2024, these EKG findings are unchanged [PW]      ED Course User Index  [PW] Elif Hector DO         Diagnoses as of 02/18/25 0630   Hypoglycemia   Adverse effect of glyburide   Hypomagnesemia     Disposition   As a result of the work-up, the patient was discharged home.  she was informed of her diagnosis and instructed to come back with any concerns or worsening of condition.  she and was agreeable to the plan as discussed above.  she was given the opportunity to ask questions.  All of the patient's questions were answered.    Procedures   Procedures    Patient seen and discussed with ED attending physician.    Elif Hector DO  Emergency Medicine       Elif Hector DO  Resident  02/18/25 0630

## 2025-02-18 NOTE — ED TRIAGE NOTES
Patient states that for the last several day her blood glucose has been dropping due to eating less because of nausea. Patient states that she is has loss her appetite has been feeling a shortness when when she is moving around. Patient also states that she has some swelling in her bilateral legs this afternoon. Patient sees a primary care provider at [facility] and cardiologist at  for her a-fib.

## 2025-03-12 ENCOUNTER — TELEPHONE (OUTPATIENT)
Dept: CARDIOLOGY | Facility: CLINIC | Age: OVER 89
End: 2025-03-12
Payer: MEDICARE

## 2025-03-12 ENCOUNTER — APPOINTMENT (OUTPATIENT)
Dept: CARDIOLOGY | Facility: CLINIC | Age: OVER 89
End: 2025-03-12
Payer: MEDICARE

## 2025-03-12 ENCOUNTER — PATIENT MESSAGE (OUTPATIENT)
Dept: CARDIOLOGY | Facility: CLINIC | Age: OVER 89
End: 2025-03-12
Payer: MEDICARE

## 2025-03-12 DIAGNOSIS — I48.21 ATRIAL FIBRILLATION, PERMANENT (MULTI): Chronic | ICD-10-CM

## 2025-03-12 NOTE — TELEPHONE ENCOUNTER
Spoke with patient and her daughter about Mary's need to follow up with heart failure MD status post admission to OSH ([facility]) for heart failure and initiation of new medication. Offered to arrange a referral to heart failure but they declined stating they prefer to stay within the [facility] system. Daughter indicated she would call [facility] and arrange follow up.

## 2025-03-18 ENCOUNTER — APPOINTMENT (OUTPATIENT)
Dept: CARDIOLOGY | Facility: CLINIC | Age: OVER 89
End: 2025-03-18
Payer: MEDICARE

## 2025-03-18 VITALS
HEART RATE: 118 BPM | HEIGHT: 63 IN | DIASTOLIC BLOOD PRESSURE: 78 MMHG | BODY MASS INDEX: 23.11 KG/M2 | SYSTOLIC BLOOD PRESSURE: 128 MMHG | WEIGHT: 130.4 LBS | TEMPERATURE: 97.2 F

## 2025-03-18 DIAGNOSIS — I50.32 CHRONIC HEART FAILURE WITH PRESERVED EJECTION FRACTION: ICD-10-CM

## 2025-03-18 DIAGNOSIS — I10 BENIGN ESSENTIAL HYPERTENSION: Primary | ICD-10-CM

## 2025-03-18 PROCEDURE — 3074F SYST BP LT 130 MM HG: CPT | Performed by: INTERNAL MEDICINE

## 2025-03-18 PROCEDURE — 1159F MED LIST DOCD IN RCRD: CPT | Performed by: INTERNAL MEDICINE

## 2025-03-18 PROCEDURE — 3078F DIAST BP <80 MM HG: CPT | Performed by: INTERNAL MEDICINE

## 2025-03-18 PROCEDURE — 99214 OFFICE O/P EST MOD 30 MIN: CPT | Performed by: INTERNAL MEDICINE

## 2025-03-18 RX ORDER — METOPROLOL SUCCINATE 50 MG/1
50 TABLET, EXTENDED RELEASE ORAL EVERY MORNING
Qty: 90 TABLET | Refills: 3 | Status: SHIPPED | OUTPATIENT
Start: 2025-03-18 | End: 2026-03-18

## 2025-03-18 NOTE — PROGRESS NOTES
Recent Hospitalizations: ED visit 3/3/25 UTI. 2/24/25-3/1/25 Acute HF.   Accompanied by: Daughter Jennifer Paige  Social Hx: Never smoked cigarettes. Denies alcohol and drug use.   Diet: low sodium, 2L fluid restriction  Exercise: None    Denies chest pain, chest pressure, palpitations, shortness of breath, orthopnea, PND. No edema noted in BLE.   Denies headaches, dizziness, lightheadedness, and falls.    She reports ROTH.   She states she has felt more fatigued since her most recent hospitalization.

## 2025-03-18 NOTE — PROGRESS NOTES
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 She is referred for consultation by Dr. Kristyn Mcgrath. She is here today with her daughter Jennifer who is RN at the [facility] (adult [place]).    Starting in 2/2025 she had episodes of hypoglycemic events at home, and then she was noted to have severe LE swelling and progressive wheezing. She was hospitalized between 2/24/2025 and 3/1/2025 at [facility]. Glimepiride was stopped and hypoglycemia resolved. Hydrochlorothiazide was stopped due to hyponatremia. She was to have LVEF 73% but with concentric LVH. AL cardiac amyloidosis was ruled out as well via blood work (kappa free 31.7, lambda free 17.8, K/L ratio 1.78). Transthyretin amyloidosis was ruled out via tech PYP scan.    She was started on SGLT2i, and with that she has been euvolemic.    Her primary complaint today is feeling tired/weak/fatigued.    Studies:    Echocardiogram 2/24/2025  - Technically difficult exam due to body habitus and suboptimal positioning.   - Exam indication: Heart Failure   - The left ventricle is normal in size. There is concentric left ventricular hypertrophy. Left ventricular systolic function is normal. EF = 73 ± 5% (2D biplane) Left ventricular diastolic function was not evaluated due to AF.   - The right ventricle is normal in size. Right ventricular systolic function is normal.   - The left atrial cavity is severely dilated.   - The right atrial cavity is moderately dilated.   - There is moderate (2+ - 3+) tricuspid valve regurgitation.   - Estimated right ventricular systolic pressure is 35 mmHg consistent with normal pulmonary artery pressures. Estimated right atrial pressure is 3 mmHg based on IVC  assessment.   - Patient has biventricular hypertrophy with thickening of the valves and biatrial  enlargement. The  "appearance is in keeping with restrictive cardiomyopathy with possible amyloidosis on the top of the differential. Consider Cardiac MRI, NM SPECT and other imaging modalities to further characterize the cardiac function.     Tech PYP scan 2/25/2025  Not consistent with cardiac amyloidosis    Exam: /78 (BP Location: Right arm, Patient Position: Sitting, BP Cuff Size: Adult)   Pulse (!) 118   Temp 36.2 °C (97.2 °F) (Temporal)   Ht 1.6 m (5' 3\")   Wt 59.1 kg (130 lb 6.4 oz)   BMI 23.10 kg/m²   Alert and oriented  Appropriate, conversant  No JVD  Irregular rhythm  CTA  No LE edema      Current Outpatient Medications   Medication Instructions    apixaban (ELIQUIS) 5 mg, 2 times daily    blood sugar diagnostic (Accu-Chek Karina Plus test strp) strip Daily RT    cholecalciferol (VITAMIN D-3) 50 mcg, Daily    dilTIAZem CD (CARDIZEM CD) 240 mg, Daily    empagliflozin (JARDIANCE) 10 mg, Daily    ferrous sulfate 65 mg, 2 times daily    ipratropium (Atrovent) 21 mcg (0.03 %) nasal spray 2 sprays, Every 12 hours    levothyroxine (TIROSINT) 112 mcg, Daily before breakfast    magnesium oxide (MAG-OX) 400 mg, Daily    metFORMIN (OSM) (FORTAMET) 500 mg, 2 times daily    metoprolol succinate XL (TOPROL-XL) 75 mg, Every morning    omeprazole (PRILOSEC) 40 mg, 2 times daily    simvastatin (ZOCOR) 10 mg, Nightly    SITagliptin phosphate (JANUVIA) 100 mg, Daily     Past medical history (non-cardiac):  -- DM2  -- Hypothyroidism  -- Hypertension    Cardiovascular history:  -- Persistent atrial fibrillation (follows with Dr. Kristyn Mcgrath, EP) on DOAC  -- HFpEF, Stage C, NYHA class III    Assessment: 89 y.o. AAF with history of persistent AF and recently diagnosed HFpEF Stage C in setting of hospitalization for fluid overload. She is now euvolemic, and is on SGLT2i (AHA/ACC Stage 1 recommended for HFpEF).    Plan:  -- Stay on SGLT2i  -- I think that her fatigue/weakness is multi-factorial and due to frailty, recovery from severe " illness/hospitalization, ongoing use of BB. I advised her to slightly reduce the dose of her metoprolol to 50mg every day. Importantly I also advised her to become more physically active as much as she can tolerate.  -- Routine visit in 3-4 m       Sorin Kiran MD, MPH  Advanced Heart Failure and Transplant Cardiology (AHFTC)  [facility]  [place], [place]

## 2025-03-18 NOTE — PATIENT INSTRUCTIONS
Thank you for coming to see us today! To reach Dr. Kiran's office please call [phone]. Fax [phone]. Call [phone] to schedule an appointment. You may also contact the HF RNs at [email or web address]  (Please include your name and date of birth)  For MEDICATION REFILLS, please call [phone] option [phone] then option 1.    DECREASE Metoprolol to 50mg daily.  Please try to do exercises at home.   Schedule a follow up in 3 months with Dr. Kiran

## 2025-03-20 ENCOUNTER — TELEPHONE (OUTPATIENT)
Dept: CARDIOLOGY | Facility: HOSPITAL | Age: OVER 89
End: 2025-03-20
Payer: MEDICARE

## 2025-07-17 ENCOUNTER — APPOINTMENT (OUTPATIENT)
Dept: CARDIOLOGY | Facility: CLINIC | Age: OVER 89
End: 2025-07-17
Payer: MEDICARE

## 2025-07-17 VITALS
WEIGHT: 139 LBS | SYSTOLIC BLOOD PRESSURE: 120 MMHG | HEART RATE: 82 BPM | BODY MASS INDEX: 24.62 KG/M2 | OXYGEN SATURATION: 96 % | DIASTOLIC BLOOD PRESSURE: 53 MMHG

## 2025-07-17 DIAGNOSIS — I50.32 CHRONIC HEART FAILURE WITH PRESERVED EJECTION FRACTION: Primary | ICD-10-CM

## 2025-07-17 DIAGNOSIS — N18.32 CHRONIC KIDNEY DISEASE, STAGE 3B (MULTI): ICD-10-CM

## 2025-07-17 PROCEDURE — 3074F SYST BP LT 130 MM HG: CPT | Performed by: INTERNAL MEDICINE

## 2025-07-17 PROCEDURE — 1036F TOBACCO NON-USER: CPT | Performed by: INTERNAL MEDICINE

## 2025-07-17 PROCEDURE — 99214 OFFICE O/P EST MOD 30 MIN: CPT | Performed by: INTERNAL MEDICINE

## 2025-07-17 PROCEDURE — 1159F MED LIST DOCD IN RCRD: CPT | Performed by: INTERNAL MEDICINE

## 2025-07-17 PROCEDURE — 3078F DIAST BP <80 MM HG: CPT | Performed by: INTERNAL MEDICINE

## 2025-07-17 NOTE — PROGRESS NOTES
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    I last saw her in March 2025, following hospitalization for acutely decompensated heart failure.    Since then she has been taking SGLT2 routinely, and reports being well without any active cardiac symptoms.  She even exercises 5 days a week, which is quite remarkable considering her age.    Studies:    Echocardiogram 2/24/2025  - Technically difficult exam due to body habitus and suboptimal positioning.   - Exam indication: Heart Failure   - The left ventricle is normal in size. There is concentric left ventricular hypertrophy. Left ventricular systolic function is normal. EF = 73 ± 5% (2D biplane) Left ventricular diastolic function was not evaluated due to AF.   - The right ventricle is normal in size. Right ventricular systolic function is normal.   - The left atrial cavity is severely dilated.   - The right atrial cavity is moderately dilated.   - There is moderate (2+ - 3+) tricuspid valve regurgitation.   - Estimated right ventricular systolic pressure is 35 mmHg consistent with normal pulmonary artery pressures. Estimated right atrial pressure is 3 mmHg based on IVC  assessment.   - Patient has biventricular hypertrophy with thickening of the valves and biatrial  enlargement. The appearance is in keeping with restrictive cardiomyopathy with possible amyloidosis on the top of the differential. Consider Cardiac MRI, NM SPECT and other imaging modalities to further characterize the cardiac function.     Tech PYP scan 2/25/2025  Not consistent with cardiac amyloidosis    Exam: /53 (BP Location: Left arm, Patient Position: Sitting, BP Cuff Size: Adult)   Pulse 82   Wt 63 kg (139 lb)   SpO2 96%   BMI 24.62 kg/m²   Alert and oriented  Appropriate, conversant  No JVD  No LE edema    Current Outpatient Medications   Medication Instructions    apixaban (ELIQUIS) 5  mg, 2 times daily    blood sugar diagnostic (Accu-Chek Karina Plus test strp) strip Daily RT    cholecalciferol (VITAMIN D-3) 50 mcg, Daily    dilTIAZem CD (CARDIZEM CD) 240 mg, Daily    empagliflozin (JARDIANCE) 10 mg, Daily    ferrous sulfate 65 mg, 2 times daily    ipratropium (Atrovent) 21 mcg (0.03 %) nasal spray 2 sprays, Every 12 hours    levothyroxine (TIROSINT) 112 mcg, Daily before breakfast    magnesium oxide (MAG-OX) 400 mg, Daily    metFORMIN (OSM) (FORTAMET) 500 mg, 2 times daily    metoprolol succinate XL (TOPROL-XL) 50 mg, oral, Every morning    omeprazole (PRILOSEC) 40 mg, 2 times daily    simvastatin (ZOCOR) 10 mg, Nightly    SITagliptin phosphate (JANUVIA) 100 mg, Daily     Past medical history (non-cardiac):  -- DM2  -- Hypothyroidism  -- Hypertension    Cardiovascular history:  -- Persistent atrial fibrillation (follows with Dr. Kristyn Mcgrath, EP) on DOAC  -- HFpEF, Stage C, NYHA class III    Assessment: 89 y.o. AAF with history of persistent AF and HFpEF Stage C. She is now euvolemic, and is on SGLT2i.    Plan:  -- Stay on same medication regimen for now    Sorin Kiran MD, MPH  Advanced Heart Failure and Transplant Cardiology ([facility]FTC)  [facility]  [place], [place]

## 2025-07-17 NOTE — PATIENT INSTRUCTIONS
To reach Dr. Kiran's office please call [phone] ([facility]). Fax [phone]. Call [phone] to schedule an appointment. You may also contact the HF RNs at [email or web address]    Thank you for coming to your appointment today. If you have any questions or need cardiac medication refills, please call the Heart Failure Office at [phone] option [phone].   You may also contact the HF RNs at [email or web address]    No changes   Continue to try to eat well, hydrate, exercise, and sleep!  Please schedule a follow up as needed with Dr. Kiran